# Patient Record
Sex: MALE | Race: WHITE | NOT HISPANIC OR LATINO | Employment: FULL TIME | URBAN - NONMETROPOLITAN AREA
[De-identification: names, ages, dates, MRNs, and addresses within clinical notes are randomized per-mention and may not be internally consistent; named-entity substitution may affect disease eponyms.]

---

## 2024-11-12 ENCOUNTER — HOSPITAL ENCOUNTER (EMERGENCY)
Facility: HOSPITAL | Age: 49
Discharge: OTHER INSTITUTION | End: 2024-11-13
Attending: STUDENT IN AN ORGANIZED HEALTH CARE EDUCATION/TRAINING PROGRAM
Payer: COMMERCIAL

## 2024-11-12 DIAGNOSIS — F10.10 ALCOHOL ABUSE: Primary | ICD-10-CM

## 2024-11-12 LAB
ALBUMIN SERPL-MCNC: 4.2 G/DL (ref 3.5–5.2)
ALBUMIN/GLOB SERPL: 1.5 G/DL
ALP SERPL-CCNC: 98 U/L (ref 39–117)
ALT SERPL W P-5'-P-CCNC: 36 U/L (ref 1–41)
AMPHET+METHAMPHET UR QL: NEGATIVE
AMPHETAMINES UR QL: NEGATIVE
ANION GAP SERPL CALCULATED.3IONS-SCNC: 13.1 MMOL/L (ref 5–15)
APAP SERPL-MCNC: <5 MCG/ML (ref 0–30)
AST SERPL-CCNC: 105 U/L (ref 1–40)
BARBITURATES UR QL SCN: NEGATIVE
BASOPHILS # BLD AUTO: 0.01 10*3/MM3 (ref 0–0.2)
BASOPHILS NFR BLD AUTO: 0.1 % (ref 0–1.5)
BENZODIAZ UR QL SCN: NEGATIVE
BILIRUB SERPL-MCNC: 0.6 MG/DL (ref 0–1.2)
BUN SERPL-MCNC: 14 MG/DL (ref 6–20)
BUN/CREAT SERPL: 15.4 (ref 7–25)
BUPRENORPHINE SERPL-MCNC: NEGATIVE NG/ML
CALCIUM SPEC-SCNC: 8.5 MG/DL (ref 8.6–10.5)
CANNABINOIDS SERPL QL: POSITIVE
CHLORIDE SERPL-SCNC: 96 MMOL/L (ref 98–107)
CO2 SERPL-SCNC: 25.9 MMOL/L (ref 22–29)
COCAINE UR QL: NEGATIVE
CREAT SERPL-MCNC: 0.91 MG/DL (ref 0.76–1.27)
DEPRECATED RDW RBC AUTO: 42.3 FL (ref 37–54)
EGFRCR SERPLBLD CKD-EPI 2021: 104 ML/MIN/1.73
EOSINOPHIL # BLD AUTO: 0.05 10*3/MM3 (ref 0–0.4)
EOSINOPHIL NFR BLD AUTO: 0.7 % (ref 0.3–6.2)
ERYTHROCYTE [DISTWIDTH] IN BLOOD BY AUTOMATED COUNT: 12.3 % (ref 12.3–15.4)
ETHANOL BLD-MCNC: 131 MG/DL (ref 0–10)
ETHANOL BLD-MCNC: 181 MG/DL (ref 0–10)
ETHANOL BLD-MCNC: 289 MG/DL (ref 0–10)
ETHANOL BLD-MCNC: 98 MG/DL (ref 0–10)
ETHANOL UR QL: 0.1 %
ETHANOL UR QL: 0.13 %
ETHANOL UR QL: 0.18 %
ETHANOL UR QL: 0.29 %
FENTANYL UR-MCNC: NEGATIVE NG/ML
GLOBULIN UR ELPH-MCNC: 2.8 GM/DL
GLUCOSE SERPL-MCNC: 101 MG/DL (ref 65–99)
HCT VFR BLD AUTO: 41.6 % (ref 37.5–51)
HGB BLD-MCNC: 15.1 G/DL (ref 13–17.7)
HOLD SPECIMEN: NORMAL
HOLD SPECIMEN: NORMAL
IMM GRANULOCYTES # BLD AUTO: 0.04 10*3/MM3 (ref 0–0.05)
IMM GRANULOCYTES NFR BLD AUTO: 0.6 % (ref 0–0.5)
LYMPHOCYTES # BLD AUTO: 2.13 10*3/MM3 (ref 0.7–3.1)
LYMPHOCYTES NFR BLD AUTO: 30.6 % (ref 19.6–45.3)
MCH RBC QN AUTO: 33.6 PG (ref 26.6–33)
MCHC RBC AUTO-ENTMCNC: 36.3 G/DL (ref 31.5–35.7)
MCV RBC AUTO: 92.7 FL (ref 79–97)
METHADONE UR QL SCN: POSITIVE
MONOCYTES # BLD AUTO: 0.36 10*3/MM3 (ref 0.1–0.9)
MONOCYTES NFR BLD AUTO: 5.2 % (ref 5–12)
NEUTROPHILS NFR BLD AUTO: 4.38 10*3/MM3 (ref 1.7–7)
NEUTROPHILS NFR BLD AUTO: 62.8 % (ref 42.7–76)
NRBC BLD AUTO-RTO: 0 /100 WBC (ref 0–0.2)
OPIATES UR QL: NEGATIVE
OXYCODONE UR QL SCN: NEGATIVE
PCP UR QL SCN: NEGATIVE
PLATELET # BLD AUTO: 225 10*3/MM3 (ref 140–450)
PMV BLD AUTO: 9.4 FL (ref 6–12)
POTASSIUM SERPL-SCNC: 3.9 MMOL/L (ref 3.5–5.2)
PROT SERPL-MCNC: 7 G/DL (ref 6–8.5)
RBC # BLD AUTO: 4.49 10*6/MM3 (ref 4.14–5.8)
SALICYLATES SERPL-MCNC: <0.3 MG/DL
SODIUM SERPL-SCNC: 135 MMOL/L (ref 136–145)
TRICYCLICS UR QL SCN: NEGATIVE
TSH SERPL DL<=0.05 MIU/L-ACNC: 0.62 UIU/ML (ref 0.27–4.2)
WBC NRBC COR # BLD AUTO: 6.97 10*3/MM3 (ref 3.4–10.8)
WHOLE BLOOD HOLD COAG: NORMAL
WHOLE BLOOD HOLD SPECIMEN: NORMAL

## 2024-11-12 PROCEDURE — 36415 COLL VENOUS BLD VENIPUNCTURE: CPT

## 2024-11-12 PROCEDURE — 82077 ASSAY SPEC XCP UR&BREATH IA: CPT | Performed by: EMERGENCY MEDICINE

## 2024-11-12 PROCEDURE — 93005 ELECTROCARDIOGRAM TRACING: CPT | Performed by: STUDENT IN AN ORGANIZED HEALTH CARE EDUCATION/TRAINING PROGRAM

## 2024-11-12 PROCEDURE — 80143 DRUG ASSAY ACETAMINOPHEN: CPT | Performed by: NURSE PRACTITIONER

## 2024-11-12 PROCEDURE — 82077 ASSAY SPEC XCP UR&BREATH IA: CPT | Performed by: NURSE PRACTITIONER

## 2024-11-12 PROCEDURE — 80179 DRUG ASSAY SALICYLATE: CPT | Performed by: NURSE PRACTITIONER

## 2024-11-12 PROCEDURE — 80307 DRUG TEST PRSMV CHEM ANLYZR: CPT | Performed by: NURSE PRACTITIONER

## 2024-11-12 PROCEDURE — 99285 EMERGENCY DEPT VISIT HI MDM: CPT | Performed by: STUDENT IN AN ORGANIZED HEALTH CARE EDUCATION/TRAINING PROGRAM

## 2024-11-12 PROCEDURE — 80050 GENERAL HEALTH PANEL: CPT | Performed by: NURSE PRACTITIONER

## 2024-11-12 PROCEDURE — 81003 URINALYSIS AUTO W/O SCOPE: CPT | Performed by: EMERGENCY MEDICINE

## 2024-11-12 RX ORDER — METHADONE HYDROCHLORIDE 10 MG/1
20 TABLET ORAL DAILY
COMMUNITY

## 2024-11-12 RX ORDER — DIAZEPAM 5 MG/1
5 TABLET ORAL ONCE
Status: COMPLETED | OUTPATIENT
Start: 2024-11-12 | End: 2024-11-12

## 2024-11-12 RX ORDER — SODIUM CHLORIDE 0.9 % (FLUSH) 0.9 %
10 SYRINGE (ML) INJECTION AS NEEDED
Status: DISCONTINUED | OUTPATIENT
Start: 2024-11-12 | End: 2024-11-13 | Stop reason: HOSPADM

## 2024-11-12 RX ORDER — NICOTINE 21 MG/24HR
1 PATCH, TRANSDERMAL 24 HOURS TRANSDERMAL
Status: DISCONTINUED | OUTPATIENT
Start: 2024-11-12 | End: 2024-11-13 | Stop reason: HOSPADM

## 2024-11-12 RX ADMIN — NICOTINE 1 PATCH: 14 PATCH TRANSDERMAL at 13:44

## 2024-11-12 RX ADMIN — DIAZEPAM 5 MG: 5 TABLET ORAL at 20:59

## 2024-11-12 NOTE — ED PROVIDER NOTES
"Subjective:  History of Present Illness:    Patient is a 49-year-old male without contributing health history.  Presents to the ER today for psychiatric evaluation and possible detox.  Patient reports that he drinks approximately 1 pint of vodka per day.  Has done so for several months.  Reports that he did have some issues with hypertension when trying to quit alcohol in the past.  Denies seizures.  Denies any other detox symptoms.  Reports last drink was approximately 1 hour PTA.  Reports he had 2 shots.  Patient smokes approximately 1 pack/day.  Denies current illicit use.  Does report history of marijuana use.  Denies OTC medication home remedy.  Denies alleviating or exacerbating factors.    Nurses Notes reviewed and agree, including vitals, allergies, social history and prior medical history.     REVIEW OF SYSTEMS: All systems reviewed and not pertinent unless noted.  Review of Systems   All other systems reviewed and are negative.      History reviewed. No pertinent past medical history.    Allergies:    Patient has no known allergies.      History reviewed. No pertinent surgical history.      Social History     Socioeconomic History    Marital status:    Tobacco Use    Smoking status: Every Day     Current packs/day: 1.00     Average packs/day: 1 pack/day for 24.9 years (24.9 ttl pk-yrs)     Types: Cigarettes     Start date: 2000    Smokeless tobacco: Never   Vaping Use    Vaping status: Never Used   Substance and Sexual Activity    Drug use: Not Currently         History reviewed. No pertinent family history.    Objective  Physical Exam:  /93   Pulse 71   Temp 98 °F (36.7 °C) (Oral)   Resp 18   Ht 177.8 cm (70\")   Wt 69.4 kg (153 lb)   SpO2 97%   BMI 21.95 kg/m²      Physical Exam  Vitals and nursing note reviewed.   Constitutional:       Appearance: Normal appearance. He is normal weight.   HENT:      Head: Normocephalic and atraumatic.      Nose: Nose normal.      Mouth/Throat:      " Mouth: Mucous membranes are moist.      Pharynx: Oropharynx is clear.   Eyes:      Extraocular Movements: Extraocular movements intact.      Conjunctiva/sclera: Conjunctivae normal.      Pupils: Pupils are equal, round, and reactive to light.   Cardiovascular:      Rate and Rhythm: Normal rate and regular rhythm.      Pulses: Normal pulses.      Heart sounds: Normal heart sounds.   Pulmonary:      Effort: Pulmonary effort is normal.      Breath sounds: Normal breath sounds.   Abdominal:      General: Abdomen is flat. Bowel sounds are normal.      Palpations: Abdomen is soft.   Musculoskeletal:         General: Normal range of motion.      Cervical back: Normal range of motion and neck supple.   Skin:     General: Skin is warm.      Capillary Refill: Capillary refill takes less than 2 seconds.   Neurological:      General: No focal deficit present.      Mental Status: He is alert and oriented to person, place, and time. Mental status is at baseline.   Psychiatric:         Mood and Affect: Mood normal.         Behavior: Behavior normal.         Thought Content: Thought content normal.         Judgment: Judgment normal.         Procedures    ED Course:    ED Course as of 11/18/24 1140   Tue Nov 12, 2024   1258 I have reviewed the mid-level provider(s) note and verbally discussed the case/plan of care.  I was available for consultation as needed at all times during the patient's visit in the Emergency Department.  I agree with the clinical impression, plan, and disposition unless otherwise stated in the MDM below.    ATTENDING ATTESTATION  HPI: 49-year-old male with past medical history of chronic alcohol abuse who presents requesting detox.  Last drink just prior to arrival.    MDM: ED workup reviewed.    EKG per my interpretation normal sinus rhythm, rate 75, normal axis, no ST segment elevation or depression, normal QRS QTC intervals.    I have reviewed the labs results.  CBC and CMP clinically unremarkable.  EtOH  289.  UDS positive for THC and methadone.     [JS]   2129 Patient signed out to me by RANDY, patient to be reevaluated after ethanol rates 100 to go to detox. [CR]   Wed Nov 13, 2024   0000 Patient case discussed with therapist, plan to get patient to detox. [CR]      ED Course User Index  [CR] Mo Smith DO  [JS] Thai Cartwright DO       Lab Results (last 24 hours)       ** No results found for the last 24 hours. **             No radiology results from the last 24 hrs       MDM      Initial impression of presenting illness: Patient is a 49-year-old male without contributing health history.  Presents to the ER today for psychiatric evaluation and possible detox.  Patient reports that he drinks approximately 1 pint of vodka per day.  Has done so for several months.  Reports that he did have some issues with hypertension when trying to quit alcohol in the past.  Denies seizures.  Denies any other detox symptoms.  Reports last drink was approximately 1 hour PTA.  Reports he had 2 shots.  Patient smokes approximately 1 pack/day.  Denies current illicit use.  Does report history of marijuana use.  Denies OTC medication home remedy.  Denies alleviating or exacerbating factors.    DDX: includes but is not limited to: Alcohol intoxication, alcohol abuse, depression, anxiety or other    Patient arrives stable with vitals interpreted by myself.     Pertinent features from physical exam: Lung sounds are clear bilaterally throughout.  Abdo soft nontender.  Bowel sounds normal.  Card sounds normal..    Initial diagnostic plan: CBC, CMP, TSH, salicylate, acetaminophen, urine drug screen, urine fentanyl, EtOH EKG    Results from initial plan were reviewed and interpreted by me revealing CBC is within appropriate range.  CMP is within appropriate range.  UDS is positive for THC and methadone.  Urine fentanyl was negative.  EtOH initially was 279.  Recheck was 181.  Third check was 131.  EKG sinus rhythm rate 71  bpm    Diagnostic information from other sources: Chart review    Interventions / Re-evaluation: Vital signs stable throughout counter.  Patient had CIWA score of 11.  Patient was given 5 mg of Valium p.o.    Results/clinical rationale were discussed with patient    Consultations/Discussion of results with other physicians: Behavioral health specialist spoke with patient while here in the ER.    Disposition plan: This provider is going off shift.  Disposition of this patient will be completed by Dr. tom  -----        Final diagnoses:   Alcohol abuse          David Banda, APRN  11/18/24 1144

## 2024-11-13 ENCOUNTER — HOSPITAL ENCOUNTER (INPATIENT)
Facility: HOSPITAL | Age: 49
LOS: 4 days | Discharge: HOME | DRG: 897 | End: 2024-11-17
Attending: PSYCHIATRY & NEUROLOGY | Admitting: PSYCHIATRY & NEUROLOGY
Payer: COMMERCIAL

## 2024-11-13 VITALS
OXYGEN SATURATION: 97 % | TEMPERATURE: 98 F | SYSTOLIC BLOOD PRESSURE: 166 MMHG | HEIGHT: 70 IN | DIASTOLIC BLOOD PRESSURE: 93 MMHG | RESPIRATION RATE: 18 BRPM | HEART RATE: 71 BPM | BODY MASS INDEX: 21.9 KG/M2 | WEIGHT: 153 LBS

## 2024-11-13 PROBLEM — F11.20 OPIOID USE DISORDER, SEVERE, ON MAINTENANCE THERAPY: Status: ACTIVE | Noted: 2024-11-13

## 2024-11-13 PROBLEM — I10 HTN (HYPERTENSION): Status: ACTIVE | Noted: 2024-11-13

## 2024-11-13 PROBLEM — F10.20 ALCOHOL DEPENDENCE: Status: ACTIVE | Noted: 2024-11-13

## 2024-11-13 PROBLEM — F17.200 NICOTINE USE DISORDER: Status: ACTIVE | Noted: 2024-11-13

## 2024-11-13 PROBLEM — F12.20 TETRAHYDROCANNABINOL (THC) USE DISORDER, MODERATE, DEPENDENCE: Status: ACTIVE | Noted: 2024-11-13

## 2024-11-13 LAB
BILIRUB UR QL STRIP: NEGATIVE
CLARITY UR: CLEAR
COLOR UR: YELLOW
GLUCOSE UR STRIP-MCNC: NEGATIVE MG/DL
HAV IGM SERPL QL IA: NORMAL
HBV CORE IGM SERPL QL IA: NORMAL
HBV SURFACE AG SERPL QL IA: NORMAL
HCV AB SER QL: NORMAL
HGB UR QL STRIP.AUTO: NEGATIVE
KETONES UR QL STRIP: NEGATIVE
LEUKOCYTE ESTERASE UR QL STRIP.AUTO: NEGATIVE
NITRITE UR QL STRIP: NEGATIVE
PH UR STRIP.AUTO: 5.5 [PH] (ref 5–8)
PROT UR QL STRIP: NEGATIVE
QT INTERVAL: 346 MS
QTC INTERVAL: 418 MS
SP GR UR STRIP: 1.01 (ref 1–1.03)
UROBILINOGEN UR QL STRIP: NORMAL

## 2024-11-13 PROCEDURE — 96374 THER/PROPH/DIAG INJ IV PUSH: CPT

## 2024-11-13 PROCEDURE — 25010000002 LORAZEPAM PER 2 MG: Performed by: EMERGENCY MEDICINE

## 2024-11-13 PROCEDURE — 93005 ELECTROCARDIOGRAM TRACING: CPT | Performed by: PSYCHIATRY & NEUROLOGY

## 2024-11-13 PROCEDURE — 80074 ACUTE HEPATITIS PANEL: CPT | Performed by: PSYCHIATRY & NEUROLOGY

## 2024-11-13 PROCEDURE — 99223 1ST HOSP IP/OBS HIGH 75: CPT | Performed by: PSYCHIATRY & NEUROLOGY

## 2024-11-13 PROCEDURE — HZ2ZZZZ DETOXIFICATION SERVICES FOR SUBSTANCE ABUSE TREATMENT: ICD-10-PCS | Performed by: PSYCHIATRY & NEUROLOGY

## 2024-11-13 RX ORDER — LOSARTAN POTASSIUM 50 MG/1
25 TABLET ORAL DAILY
Status: DISCONTINUED | OUTPATIENT
Start: 2024-11-13 | End: 2024-11-17 | Stop reason: HOSPADM

## 2024-11-13 RX ORDER — LOSARTAN POTASSIUM 25 MG/1
25 TABLET ORAL DAILY
Status: ON HOLD | COMMUNITY
End: 2024-11-17

## 2024-11-13 RX ORDER — ECHINACEA PURPUREA EXTRACT 125 MG
2 TABLET ORAL AS NEEDED
Status: DISCONTINUED | OUTPATIENT
Start: 2024-11-13 | End: 2024-11-17 | Stop reason: HOSPADM

## 2024-11-13 RX ORDER — FAMOTIDINE 20 MG/1
20 TABLET, FILM COATED ORAL 2 TIMES DAILY PRN
Status: DISCONTINUED | OUTPATIENT
Start: 2024-11-13 | End: 2024-11-17 | Stop reason: HOSPADM

## 2024-11-13 RX ORDER — LORAZEPAM 0.5 MG/1
2 TABLET ORAL ONCE
Status: COMPLETED | OUTPATIENT
Start: 2024-11-13 | End: 2024-11-13

## 2024-11-13 RX ORDER — ALUMINA, MAGNESIA, AND SIMETHICONE 2400; 2400; 240 MG/30ML; MG/30ML; MG/30ML
15 SUSPENSION ORAL EVERY 6 HOURS PRN
Status: DISCONTINUED | OUTPATIENT
Start: 2024-11-13 | End: 2024-11-17 | Stop reason: HOSPADM

## 2024-11-13 RX ORDER — LOPERAMIDE HYDROCHLORIDE 2 MG/1
2 CAPSULE ORAL
Status: DISCONTINUED | OUTPATIENT
Start: 2024-11-13 | End: 2024-11-17 | Stop reason: HOSPADM

## 2024-11-13 RX ORDER — METHADONE HYDROCHLORIDE 10 MG/1
20 TABLET ORAL DAILY
Status: CANCELLED | OUTPATIENT
Start: 2024-11-13

## 2024-11-13 RX ORDER — BENZTROPINE MESYLATE 1 MG/1
2 TABLET ORAL ONCE AS NEEDED
Status: DISCONTINUED | OUTPATIENT
Start: 2024-11-13 | End: 2024-11-17 | Stop reason: HOSPADM

## 2024-11-13 RX ORDER — ONDANSETRON 4 MG/1
4 TABLET, ORALLY DISINTEGRATING ORAL EVERY 6 HOURS PRN
Status: DISCONTINUED | OUTPATIENT
Start: 2024-11-13 | End: 2024-11-17 | Stop reason: HOSPADM

## 2024-11-13 RX ORDER — METHADONE HYDROCHLORIDE 10 MG/1
20 TABLET ORAL DAILY
Status: DISCONTINUED | OUTPATIENT
Start: 2024-11-13 | End: 2024-11-17 | Stop reason: HOSPADM

## 2024-11-13 RX ORDER — LORAZEPAM 2 MG/1
2 TABLET ORAL EVERY 4 HOURS PRN
Status: DISCONTINUED | OUTPATIENT
Start: 2024-11-14 | End: 2024-11-15

## 2024-11-13 RX ORDER — BENZONATATE 100 MG/1
100 CAPSULE ORAL 3 TIMES DAILY PRN
Status: DISCONTINUED | OUTPATIENT
Start: 2024-11-13 | End: 2024-11-17 | Stop reason: HOSPADM

## 2024-11-13 RX ORDER — IBUPROFEN 400 MG/1
400 TABLET, FILM COATED ORAL EVERY 6 HOURS PRN
Status: DISCONTINUED | OUTPATIENT
Start: 2024-11-13 | End: 2024-11-17 | Stop reason: HOSPADM

## 2024-11-13 RX ORDER — POLYETHYLENE GLYCOL 3350 17 G/17G
17 POWDER, FOR SOLUTION ORAL DAILY PRN
Status: DISCONTINUED | OUTPATIENT
Start: 2024-11-13 | End: 2024-11-17 | Stop reason: HOSPADM

## 2024-11-13 RX ORDER — TRAZODONE HYDROCHLORIDE 50 MG/1
50 TABLET, FILM COATED ORAL NIGHTLY PRN
Status: DISCONTINUED | OUTPATIENT
Start: 2024-11-13 | End: 2024-11-17 | Stop reason: HOSPADM

## 2024-11-13 RX ORDER — LORAZEPAM 0.5 MG/1
0.5 TABLET ORAL
Status: DISCONTINUED | OUTPATIENT
Start: 2024-11-17 | End: 2024-11-15

## 2024-11-13 RX ORDER — LORAZEPAM 2 MG/1
2 TABLET ORAL
Status: COMPLETED | OUTPATIENT
Start: 2024-11-14 | End: 2024-11-14

## 2024-11-13 RX ORDER — LORAZEPAM 2 MG/1
2 TABLET ORAL
Status: DISPENSED | OUTPATIENT
Start: 2024-11-13 | End: 2024-11-14

## 2024-11-13 RX ORDER — ACETAMINOPHEN 325 MG/1
650 TABLET ORAL EVERY 6 HOURS PRN
Status: DISCONTINUED | OUTPATIENT
Start: 2024-11-13 | End: 2024-11-17 | Stop reason: HOSPADM

## 2024-11-13 RX ORDER — LORAZEPAM 2 MG/ML
2 INJECTION INTRAMUSCULAR ONCE
Status: COMPLETED | OUTPATIENT
Start: 2024-11-13 | End: 2024-11-13

## 2024-11-13 RX ORDER — HYDROXYZINE HYDROCHLORIDE 50 MG/1
50 TABLET, FILM COATED ORAL EVERY 6 HOURS PRN
Status: DISCONTINUED | OUTPATIENT
Start: 2024-11-13 | End: 2024-11-17 | Stop reason: HOSPADM

## 2024-11-13 RX ORDER — LORAZEPAM 1 MG/1
1 TABLET ORAL EVERY 4 HOURS PRN
Status: DISCONTINUED | OUTPATIENT
Start: 2024-11-16 | End: 2024-11-15

## 2024-11-13 RX ORDER — BENZTROPINE MESYLATE 1 MG/ML
1 INJECTION, SOLUTION INTRAMUSCULAR; INTRAVENOUS ONCE AS NEEDED
Status: DISCONTINUED | OUTPATIENT
Start: 2024-11-13 | End: 2024-11-17 | Stop reason: HOSPADM

## 2024-11-13 RX ORDER — LORAZEPAM 0.5 MG/1
0.5 TABLET ORAL EVERY 4 HOURS PRN
Status: DISCONTINUED | OUTPATIENT
Start: 2024-11-17 | End: 2024-11-15

## 2024-11-13 RX ORDER — LORAZEPAM 1 MG/1
1 TABLET ORAL
Status: DISCONTINUED | OUTPATIENT
Start: 2024-11-16 | End: 2024-11-15

## 2024-11-13 RX ADMIN — LORAZEPAM 2 MG: 2 TABLET ORAL at 08:20

## 2024-11-13 RX ADMIN — METHADONE HYDROCHLORIDE 20 MG: 10 TABLET ORAL at 09:35

## 2024-11-13 RX ADMIN — LOSARTAN POTASSIUM 25 MG: 50 TABLET, FILM COATED ORAL at 08:21

## 2024-11-13 RX ADMIN — LORAZEPAM 2 MG: 2 TABLET ORAL at 20:11

## 2024-11-13 RX ADMIN — HYDROXYZINE HYDROCHLORIDE 50 MG: 50 TABLET, FILM COATED ORAL at 08:20

## 2024-11-13 RX ADMIN — LORAZEPAM 2 MG: 2 TABLET ORAL at 03:47

## 2024-11-13 RX ADMIN — LORAZEPAM 2 MG: 0.5 TABLET ORAL at 02:02

## 2024-11-13 RX ADMIN — LORAZEPAM 2 MG: 2 TABLET ORAL at 12:17

## 2024-11-13 RX ADMIN — LORAZEPAM 2 MG: 2 INJECTION INTRAMUSCULAR; INTRAVENOUS at 01:10
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
## 2024-11-13 NOTE — H&P
INITIAL PSYCHIATRIC HISTORY & PHYSICAL    Patient Identification:  Name:  Alex Hoover  Age:  49 y.o.  Sex:  male  :  1975  MRN:  [id number]   Visit Number:  [id number]  Primary Care Physician:  Provider, No Known    SUBJECTIVE    CC/Focus of Exam: Alcohol use    HPI: Alex Hoover is a 49 y.o. male who was admitted on 2024 with complaints of alcohol use and withdrawals. The patient reports a long history of substance use. First use was at age 18 and drank socially af first. About a year ago, he started drinking regularly. Over time the use increased and the patient  continued to use despite negative consequences including relationship problems, social and financial problems. The patient endorses symptoms of tolerance and withdrawals and ongoing cravings to use. Has tried to cut down and stop but has not been successful. Spends too much time and resources in pursuit of substance use. Longest period of sobriety is reported to be a couple of days.  Currently using 3 quarters to a pint of vodka daily.   Last use was yesterday.   Withdrawal symptoms include shakes, tremors, nervousness.     PAST PSYCHIATRIC HX: Patient has been treated for mood symptoms in the past.     SUBSTANCE USE HX: See HPI for alcohol. The patient reports a history of pain medications around age 20 after surgeries and has been on methadone maintenance for the last couple of years. His current dose of methadone is 20 mg daily.     SOCIAL HX:   Social History     Socioeconomic History    Marital status:    Tobacco Use    Smoking status: Every Day     Current packs/day: 1.00     Average packs/day: 1 pack/day for 24.9 years (24.9 ttl pk-yrs)     Types: Cigarettes     Start date:     Smokeless tobacco: Never   Vaping Use    Vaping status: Never Used   Substance and Sexual Activity    Drug use: Not Currently     Past Medical History: HTN    Past Surgical History: 3 arthroscopic knee surgeries.     Family history: Father  has a history of alcoholism.       Medications Prior to Admission   Medication Sig Dispense Refill Last Dose/Taking    methadone (DOLOPHINE) 10 MG tablet Take 2 tablets by mouth Daily,   11/12/2024 Morning    losartan (COZAAR) 25 MG tablet Take 1 tablet by mouth Daily.   Unknown         ALLERGIES:  Patient has no known allergies.    Temp:  [97 °F (36.1 °C)-98 °F (36.7 °C)] 97 °F (36.1 °C)  Heart Rate:  [71-99] 80  Resp:  [15-18] 15  BP: (133-166)/() 146/85    REVIEW OF SYSTEMS:  Review of Systems   Constitutional:  Positive for chills, diaphoresis and fatigue.   HENT: Negative.     Eyes: Negative.    Respiratory: Negative.     Cardiovascular: Negative.    Gastrointestinal:  Positive for abdominal pain.   Endocrine: Negative.    Genitourinary: Negative.    Musculoskeletal:  Positive for myalgias.   Skin: Negative.    Neurological:  Positive for tremors and weakness.   Hematological: Negative.    Psychiatric/Behavioral:  Positive for dysphoric mood. The patient is nervous/anxious.         OBJECTIVE    PHYSICAL EXAM:  Physical Exam  Constitutional:  Appears well-developed and well-nourished.   HENT:   Head: Normocephalic and atraumatic.   Right Ear: External ear normal.   Left Ear: External ear normal.   Mouth/Throat: Oropharynx is clear and moist.   Eyes: Pupils are equal, round, and reactive to light. Conjunctivae and EOM are normal.   Neck: Normal range of motion. Neck supple.   Cardiovascular: Normal rate, regular rhythm and normal heart sounds.    Respiratory: Effort normal and breath sounds normal. No respiratory distress. No wheezes.   GI: Soft. Bowel sounds are normal.No distension. There is no tenderness.   Musculoskeletal: Normal range of motion. No edema or deformity.   Neurological:  Cranial Nerves: I. No anosmia. II: No visual disturbance. III, IV VI: EOMI, PERRLA. V: Corneal reflext intact, no abnormal sensations. VII: No facial palsy, or altered sensation. VIII: Hearing intact, balance intact. IX:  Intact ah reflex. X: Normal phonation, swallowing. XI: Normal shrug and head movement. XII: Intact tongue movements  Coordination normal. No lateralizing signs.  Skin: Skin is warm and dry. No rash noted. No erythema.     MENTAL STATUS EXAM:   Hygiene:   fair  Cooperation:  Cooperative  Eye Contact:  Fair  Psychomotor Behavior:  Appropriate  Affect:  Appropriate  Hopelessness: Denies  Speech:  Normal  Thought Process: Goal directed  Thought Content:  Normal  Suicidal:  None  Homicidal:  None  Hallucinations:  None  Delusion:  None  Memory:  Intact  Orientation:  Person, Place, Time and Situation  Reliability:  fair  Insight:  Fair  Judgement:  Fair  Impulse Control:  Fair    Imaging Results (Last 24 Hours)       ** No results found for the last 24 hours. **             ECG/EMG Results (most recent)       Procedure Component Value Units Date/Time    ECG 12 Lead Other; Baseline Cardiac Status [[id number]] Collected: 11/14/24 0434     Updated: 11/13/24 0435     QT Interval 346 ms      QTC Interval 418 ms     Narrative:      Test Reason : Other~  Blood Pressure :   */*   mmHG  Vent. Rate :  88 BPM     Atrial Rate :  88 BPM     P-R Int : 134 ms          QRS Dur :  86 ms      QT Int : 346 ms       P-R-T Axes :  63   1  60 degrees    QTcB Int : 418 ms    Normal sinus rhythm  Normal ECG  No previous ECGs available    Referred By:            Confirmed By:              Lab Results   Component Value Date    GLUCOSE 101 (H) 11/12/2024    BUN 14 11/12/2024    CREATININE 0.91 11/12/2024    BCR 15.4 11/12/2024    CO2 25.9 11/12/2024    CALCIUM 8.5 (L) 11/12/2024    ALBUMIN 4.2 11/12/2024     (H) 11/12/2024    ALT 36 11/12/2024       Lab Results   Component Value Date    WBC 6.97 11/12/2024    HGB 15.1 11/12/2024    HCT 41.6 11/12/2024    MCV 92.7 11/12/2024     11/12/2024       Last Urine Toxicity          Latest Ref Rng & Units 11/12/2024   LAST URINE TOXICITY RESULTS   Amphetamine, Urine Qual Negative Negative     Barbiturates Screen, Urine Negative Negative    Benzodiazepine Screen, Urine Negative Negative    Buprenorphine, Screen, Urine Negative Negative    Cocaine Screen, Urine Negative Negative    Fentanyl, Urine Negative Negative    Methadone Screen , Urine Negative Positive    Methamphetamine, Ur Negative Negative       Details                   Brief Urine Lab Results  (Last result in the past 365 days)        Color   Clarity   Blood   Leuk Est   Nitrite   Protein   CREAT   Urine HCG        11/12/24 1327 Yellow   Clear   Negative   Negative   Negative   Negative                   DATA  Labs reviewed. Sodium 135, chloride 96, glucose 101, calcium 8.5, . MCH 33.6, MCHC 36.3. Hep screen negative. Blood alcohol level 289 mg/dL. UDS positive for methadone, thc.   EKG reviewed. QTc 459 ms  JACKY reviewed.   Record reviewed. No previous treatment noted in this hospital for mental health or substance use problems.       Strengths: Motivated for treatment    Weaknesses:Unemployed, Substance use, and Poor coping skills    Code status:  Full  Discussed code status with patient.    ASSESSMENT & PLAN:    Hospital bed: No      Alcohol use disorder, severe, dependence  -Ativan detox  -Thiamine and folate      Opioid use disorder, severe, on maintenance therapy  -Continue methadone maintenance      HTN (hypertension)   -Continue losartan      Nicotine use disorder  -Encourage cessation      Tetrahydrocannabinol (THC) use disorder, moderate, dependence  -Supportive treatment      The patient has been admitted for safety and stabilization.  Patient will be monitored for suicidality daily and maintained on Special Precautions Level 4 (q30 min checks).  The patient will have individual and group therapy with a master's level therapist. A master treatment plan will be developed and agreed upon by the patient and his/her treatment team.  The patient's estimated length of stay in the hospital is 5-7 days.

## 2024-11-13 NOTE — PLAN OF CARE
Goal Outcome Evaluation:  Plan of Care Reviewed With: patient  Patient Agreement with Plan of Care: agrees     Progress:  (new patient  @ 0313 this am.)

## 2024-11-13 NOTE — DISCHARGE PLACEMENT REQUEST
"Licha Hoover (49 y.o. Male)       Date of Birth   1975    Social Security Number       Address   [address] [place] [address]    Home Phone   [phone]    MRN   [id number]       Ev[employer]   [employer]    Marital Status                               Admission Date   24    Admission Type   Urgent    Admitting Provider   Brayden Cash MD    Attending Provider   Lenora Mclain MD    Department, Room/Bed   [facility] ADULT CD, 1043/2S       Discharge Date       Discharge Disposition       Discharge Destination                                 Attending Provider: Lenora Mclain MD    Allergies: No Known Allergies    Isolation: None   Infection: None   Code Status: CPR    Ht: 177.8 cm (70\")   Wt: 69.1 kg (152 lb 6.4 oz)    Admission Cmt: None   Principal Problem: Alcohol use disorder, severe, dependence [F10.20]                   Active Insurance as of 2024       Primary Coverage       Payor Plan Insurance Group Employer/Plan Group    [employer] [place] [employer] [place]ICA[place]        Payor Plan Address Payor Plan Phone Number Payor Plan Fax Number Effective Dates    [address] [address] [phone]  2024 - None Entered    [facility] [id number]         Subscriber Name Subscriber Birth Date Member ID       LICHA HOOVER 1975 [id number]                     Emergency Contacts        (Rel.) Home Phone Work Phone Mobile Phone    KELLIE HEREDIA (Relative) [phone] -- --                 History & Physical        Lenora Mclain MD at 24 1151                INITIAL PSYCHIATRIC HISTORY & PHYSICAL    Patient Identification:  Name:  Licha Hoover  Age:  49 y.o.  Sex:  male  :  1975  MRN:  [id number]   Visit Number:  [id number]  Primary Care Physician:  Provider, No Known    SUBJECTIVE    CC/Focus of Exam: Alcohol use    HPI: Licha Hoover is a 49 y.o. male who was admitted on 2024 with complaints of alcohol use and withdrawals. The patient reports a long " history of substance use. First use was at age 18 and drank socially af first. About a year ago, he started drinking regularly. Over time the use increased and the patient  continued to use despite negative consequences including relationship problems, social and financial problems. The patient endorses symptoms of tolerance and withdrawals and ongoing cravings to use. Has tried to cut down and stop but has not been successful. Spends too much time and resources in pursuit of substance use. Longest period of sobriety is reported to be a couple of days.  Currently using 3 quarters to a pint of vodka daily.   Last use was yesterday.   Withdrawal symptoms include shakes, tremors, nervousness.     PAST PSYCHIATRIC HX: Patient has been treated for mood symptoms in the past.     SUBSTANCE USE HX: See HPI for alcohol. The patient reports a history of pain medications around age 20 after surgeries and has been on methadone maintenance for the last couple of years. His current dose of methadone is 20 mg daily.     SOCIAL HX:   Social History     Socioeconomic History    Marital status:    Tobacco Use    Smoking status: Every Day     Current packs/day: 1.00     Average packs/day: 1 pack/day for 24.9 years (24.9 ttl pk-yrs)     Types: Cigarettes     Start date: 2000    Smokeless tobacco: Never   Vaping Use    Vaping status: Never Used   Substance and Sexual Activity    Drug use: Not Currently     Past Medical History: HTN    Past Surgical History: 3 arthroscopic knee surgeries.     Family history: Father has a history of alcoholism.       Medications Prior to Admission   Medication Sig Dispense Refill Last Dose/Taking    methadone (DOLOPHINE) 10 MG tablet Take 2 tablets by mouth Daily,   11/12/2024 Morning    losartan (COZAAR) 25 MG tablet Take 1 tablet by mouth Daily.   Unknown         ALLERGIES:  Patient has no known allergies.    Temp:  [97 °F (36.1 °C)-98 °F (36.7 °C)] 97 °F (36.1 °C)  Heart Rate:  [71-99]  80  Resp:  [15-18] 15  BP: (133-166)/() 146/85    REVIEW OF SYSTEMS:  Review of Systems   Constitutional:  Positive for chills, diaphoresis and fatigue.   HENT: Negative.     Eyes: Negative.    Respiratory: Negative.     Cardiovascular: Negative.    Gastrointestinal:  Positive for abdominal pain.   Endocrine: Negative.    Genitourinary: Negative.    Musculoskeletal:  Positive for myalgias.   Skin: Negative.    Neurological:  Positive for tremors and weakness.   Hematological: Negative.    Psychiatric/Behavioral:  Positive for dysphoric mood. The patient is nervous/anxious.         OBJECTIVE    PHYSICAL EXAM:  Physical Exam  Constitutional:  Appears well-developed and well-nourished.   HENT:   Head: Normocephalic and atraumatic.   Right Ear: External ear normal.   Left Ear: External ear normal.   Mouth/Throat: Oropharynx is clear and moist.   Eyes: Pupils are equal, round, and reactive to light. Conjunctivae and EOM are normal.   Neck: Normal range of motion. Neck supple.   Cardiovascular: Normal rate, regular rhythm and normal heart sounds.    Respiratory: Effort normal and breath sounds normal. No respiratory distress. No wheezes.   GI: Soft. Bowel sounds are normal.No distension. There is no tenderness.   Musculoskeletal: Normal range of motion. No edema or deformity.   Neurological:  Cranial Nerves: I. No anosmia. II: No visual disturbance. III, IV VI: EOMI, PERRLA. V: Corneal reflext intact, no abnormal sensations. VII: No facial palsy, or altered sensation. VIII: Hearing intact, balance intact. IX: Intact ah reflex. X: Normal phonation, swallowing. XI: Normal shrug and head movement. XII: Intact tongue movements  Coordination normal. No lateralizing signs.  Skin: Skin is warm and dry. No rash noted. No erythema.     MENTAL STATUS EXAM:   Hygiene:   fair  Cooperation:  Cooperative  Eye Contact:  Fair  Psychomotor Behavior:  Appropriate  Affect:  Appropriate  Hopelessness: Denies  Speech:  Normal  Thought  Process: Goal directed  Thought Content:  Normal  Suicidal:  None  Homicidal:  None  Hallucinations:  None  Delusion:  None  Memory:  Intact  Orientation:  Person, Place, Time and Situation  Reliability:  fair  Insight:  Fair  Judgement:  Fair  Impulse Control:  Fair    Imaging Results (Last 24 Hours)       ** No results found for the last 24 hours. **             ECG/EMG Results (most recent)       Procedure Component Value Units Date/Time    ECG 12 Lead Other; Baseline Cardiac Status [[id number]] Collected: 11/14/24 0434     Updated: 11/13/24 0435     QT Interval 346 ms      QTC Interval 418 ms     Narrative:      Test Reason : Other~  Blood Pressure :   */*   mmHG  Vent. Rate :  88 BPM     Atrial Rate :  88 BPM     P-R Int : 134 ms          QRS Dur :  86 ms      QT Int : 346 ms       P-R-T Axes :  63   1  60 degrees    QTcB Int : 418 ms    Normal sinus rhythm  Normal ECG  No previous ECGs available    Referred By:            Confirmed By:              Lab Results   Component Value Date    GLUCOSE 101 (H) 11/12/2024    BUN 14 11/12/2024    CREATININE 0.91 11/12/2024    BCR 15.4 11/12/2024    CO2 25.9 11/12/2024    CALCIUM 8.5 (L) 11/12/2024    ALBUMIN 4.2 11/12/2024     (H) 11/12/2024    ALT 36 11/12/2024       Lab Results   Component Value Date    WBC 6.97 11/12/2024    HGB 15.1 11/12/2024    HCT 41.6 11/12/2024    MCV 92.7 11/12/2024     11/12/2024       Last Urine Toxicity          Latest Ref Rng & Units 11/12/2024   LAST URINE TOXICITY RESULTS   Amphetamine, Urine Qual Negative Negative    Barbiturates Screen, Urine Negative Negative    Benzodiazepine Screen, Urine Negative Negative    Buprenorphine, Screen, Urine Negative Negative    Cocaine Screen, Urine Negative Negative    Fentanyl, Urine Negative Negative    Methadone Screen , Urine Negative Positive    Methamphetamine, Ur Negative Negative       Details                   Brief Urine Lab Results  (Last result in the past 365 days)         Color   Clarity   Blood   Leuk Est   Nitrite   Protein   CREAT   Urine HCG        11/12/24 1327 Yellow   Clear   Negative   Negative   Negative   Negative                   DATA  Labs reviewed. Sodium 135, chloride 96, glucose 101, calcium 8.5, . MCH 33.6, MCHC 36.3. Hep screen negative. Blood alcohol level 289 mg/dL. UDS positive for methadone, thc.   EKG reviewed. QTc 459 ms  JACKY reviewed.   Record reviewed. No previous treatment noted in this hospital for mental health or substance use problems.       Strengths: Motivated for treatment    Weaknesses:Unemployed, Substance use, and Poor coping skills    Code status:  Full  Discussed code status with patient.    ASSESSMENT & PLAN:    Hospital bed: No      Alcohol use disorder, severe, dependence  -Ativan detox  -Thiamine and folate      Opioid use disorder, severe, on maintenance therapy  -Continue methadone maintenance      HTN (hypertension)   -Continue losartan      Nicotine use disorder  -Encourage cessation      Tetrahydrocannabinol (THC) use disorder, moderate, dependence  -Supportive treatment      The patient has been admitted for safety and stabilization.  Patient will be monitored for suicidality daily and maintained on Special Precautions Level 4 (q30 min checks).  The patient will have individual and group therapy with a master's level therapist. A master treatment plan will be developed and agreed upon by the patient and his/her treatment team.  The patient's estimated length of stay in the hospital is 5-7 days.             Electronically signed by Lenora Mclain MD at 11/13/24 1205       Physician Progress Notes (last 24 hours)  Notes from 11/12/24 1318 through 11/13/24 1318   No notes of this type exist for this encounter.

## 2024-11-13 NOTE — PLAN OF CARE
Goal Outcome Evaluation:  Plan of Care Reviewed With: patient  Plan of Care Reviewed With: patient  Patient Agreement with Plan of Care: agrees  Consent Given to Review Plan with: Pt new admit from [facility] The death of mother increased drinking few months ago. Seperation and marital problems with wife is increased stressers. Pt ratess Anx 8 Dep 8 Denies any SI/HI/AVH or history thereof Pt experiencing elevated blood pressure, tachycardia, tremors, nausea,body aches and generalysed weakness Pt reports drinking pint of vodka daily Experiencing adequate bowel movement and urination without any issues He has Hx of HTN Per intakePatient reports presenting to the emergency department for help with alcohol abuse. Patient tells me he drinks 1 pint of vodka daily and has been for the past couple of months. Patient's denies SI/HI. ETOH on arrival was 289. CIWA at the time of assessment was 12. Pt is recieving methadone treatment
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
## 2024-11-13 NOTE — ED PROVIDER NOTES
Patient care transferred to me at 0030 by Dr. Smith at time of shift change ending behavioral health disposition.    Behavioral health request 2 mg of p.o. Ativan and he has been accepted by Dr. Cash to [facility].     Diagnosis Plan   1. Alcohol abuse            ED Disposition       ED Disposition   Transfer to Another Facility     Condition   --    Comment   --                  Chencho Benoit MD  11/13/24 6192

## 2024-11-13 NOTE — PROGRESS NOTES
Navigator is helping with the following referrals:    [facility] - [phone]  -Sent 11/13  -Transferred call so patient could complete phone screening. 11/13  -Patient completed phone screening. They do not allow methadone so patient did not want to commit to being admitted to their facility yet. Once patient makes a decision treatment team can call intake to further coordinate. 11/13    [facility] - [phone]  -Sent 11/13  -Transferred call so patient could complete phone screening.  11/13  -Patient will need to call his home [facility] clinic to get dosing records to send to Intake before they can officially accept patient and schedule bed.  11/13  - Received call from [facility]. They have already coordinated with the [place] location to transport patient to [facility] for dosing. Treatment team to call Friday to schedule bed date and transportation.  11/13    [facility] - [phone]

## 2024-11-14 PROCEDURE — 93010 ELECTROCARDIOGRAM REPORT: CPT | Performed by: INTERNAL MEDICINE

## 2024-11-14 PROCEDURE — 99232 SBSQ HOSP IP/OBS MODERATE 35: CPT | Performed by: PSYCHIATRY & NEUROLOGY

## 2024-11-14 RX ADMIN — LOSARTAN POTASSIUM 25 MG: 50 TABLET, FILM COATED ORAL at 08:36

## 2024-11-14 RX ADMIN — METHADONE HYDROCHLORIDE 20 MG: 10 TABLET ORAL at 08:36

## 2024-11-14 RX ADMIN — HYDROXYZINE HYDROCHLORIDE 50 MG: 50 TABLET, FILM COATED ORAL at 21:09

## 2024-11-14 RX ADMIN — LORAZEPAM 2 MG: 2 TABLET ORAL at 21:09

## 2024-11-14 RX ADMIN — LORAZEPAM 2 MG: 2 TABLET ORAL at 08:36

## 2024-11-14 RX ADMIN — LORAZEPAM 2 MG: 2 TABLET ORAL at 14:17

## 2024-11-14 NOTE — PLAN OF CARE
Goal Outcome Evaluation:  Plan of Care Reviewed With: patient  Plan of Care Reviewed With: patient  Patient Agreement with Plan of Care: agrees     Progress: improving  Outcome Evaluation: Pt has been calm and cooperative, spends majority of shift in room. Pt rates anxiety 4, depression 4, states sleep and appetite are getting better. Pt denies SI/HI/AVH. Pt rates cravings 6, c/o tremor.

## 2024-11-14 NOTE — PLAN OF CARE
Goal Outcome Evaluation:        Problem: Adult Behavioral Health Plan of Care  Goal: Patient-Specific Goal (Individualization)  Outcome: Progressing  Flowsheets  Taken 11/13/2024 0959  Patient/Family-Specific Goals (Include Timeframe): Patient will identify 2-3 coping skills, complete aftercare plans, address relapse prevention methods, and deny SI/HI prior to discharge. Patient's long term goal is to maintain sobriety for the next 30 days.  Individualized Care Needs: Therapist to offer 1-4 therapy sessions, aftercare planning, safety planning, group therapy, family education, and brief CBT/MI interventions.  Anxieties, Fears or Concerns: none verbalized  Taken 11/13/2024 0952  Patient Personal Strengths:   resilient   resourceful   motivated for recovery   motivated for treatment   stable living environment  Patient Vulnerabilities:   substance abuse/addiction   history of unsuccessful treatment   poor impulse control   lacks insight into illness   family/relationship conflict   recent loss  Goal: Optimized Coping Skills in Response to Life Stressors  Outcome: Progressing  Intervention: Promote Effective Coping Strategies  Flowsheets (Taken 11/14/2024 0932)  Supportive Measures:   active listening utilized   counseling provided   decision-making supported   goal-setting facilitated   verbalization of feelings encouraged   self-responsibility promoted   self-reflection promoted   self-care encouraged   relaxation techniques promoted   positive reinforcement provided  Goal: Develops/Participates in Therapeutic [place] to Support Successful Transition  Outcome: Progressing  Intervention: Foster Therapeutic [place]  Flowsheets (Taken 11/14/2024 0932)  Trust Relationship/Rapport:   care explained   reassurance provided   thoughts/feelings acknowledged   choices provided   emotional support provided   empathic listening provided   questions answered   questions encouraged  Intervention: Mutually Develop Transition  Plan  Flowsheets  Taken 11/14/2024 0929  Discharge Coordination/Progress: Patient has [employer] Medicaid. Patient to admit to [facility] at discharge, agency to provide transportation.  Transportation Anticipated: family or friend will provide  Transportation Concerns: none  Current Discharge Risk: substance use/abuse  Concerns to be Addressed:   substance/tobacco abuse/use   coping/stress   cognitive/perceptual   mental health   discharge planning  Readmission Within the Last 30 Days: no previous admission in last 30 days  Patient/Family Anticipated Services at Transition:   mental health services   outpatient care  Patient's Choice of Community Agency(s): [facility]  Patient/Family Anticipates Transition to: home with family  Offered/Gave Vendor List: yes  Taken 11/13/2024 0959  Outpatient/Agency/Support Group Needs:   outpatient substance abuse treatment (specify)   outpatient psychiatric care (specify)   residential services   outpatient medication management   outpatient counseling   intensive outpatient services  Transition Support:   follow-up care discussed   follow-up care coordinated   community resources reviewed   crisis management plan promoted   crisis management plan verbalized  Anticipated Discharge Disposition: home with family      DATA:  Therapist met with patient individually this date. Patient agreeable to discuss current treatment progress and discharge concerns.     CLINICAL MANUVERING/INTERVENTIONS:    Assisted patient in processing session content; acknowledged and normalized patient’s thoughts, feelings, and concerns by utilizing a person-centered approach in efforts to build appropriate rapport and a positive therapeutic relationship with open and honest communication. Allowed patient to ventilate regarding current stressors and triggers for negative emotions and thoughts in a safe nonjudgmental environment with unconditional positive regard, active listening skills, and empathy.  Therapist implemented motivational interviewing techniques to assist patient with exploring personal growth and change and discussed distress tolerance skills, self soothing techniques, and applied cognitive behavioral strategies to facilitate identification of maladaptive patterns of thinking and behavior. Therapist utilized dialectical behavior techniques to teach and model emotional regulation and relaxation methods. Therapist assisted patient with identifying and implementing healthier coping strategies.     ASSESSMENT:  Therapist met with patient on this date, he continues to receive treatment for alcohol detox. Patient reports mild anxiety and depression, denies SI/HI/AVH. Patient experiencing mild tremors and cravings. He is agreeable to admit to [facility] at discharge, agency to provide transportation. Treatment team will continue to keep RW updated on expected discharge date to coordinate admission. Patient denies having any additional needs or concerns at this time.     PLAN:   Patient will continue stabilization. Patient will continue to receive services offered by Treatment Team.     Patient to admit to [facility] at discharge.

## 2024-11-14 NOTE — PROGRESS NOTES
"INPATIENT PSYCHIATRIC PROGRESS NOTE    Name:  Alex Hoover  :  1975  MRN:  [id number]  Visit Number:  [id number]  Length of stay:  1    SUBJECTIVE    CC/Focus of Exam: Alcohol use    INTERVAL HISTORY:  The patient reports he is feeling better today. Has some withdrawal symptoms but they are getting better.   Depression rating 6/10  Anxiety rating 6/10  Sleep: good  Withdrawal sx: tremors, chills, sweats.  Cravin/10    Review of Systems   Constitutional:  Positive for chills, diaphoresis and fatigue.   Gastrointestinal:  Positive for nausea.   Neurological:  Positive for tremors.   Psychiatric/Behavioral:  Positive for dysphoric mood. The patient is nervous/anxious.        OBJECTIVE    Temp:  [97.5 °F (36.4 °C)-100 °F (37.8 °C)] 97.6 °F (36.4 °C)  Heart Rate:  [72-96] 74  Resp:  [18-20] 20  BP: (126-165)/(84-99) 135/99    MENTAL STATUS EXAM:  Appearance:Casually dressed, good hygeine.   Cooperation:Cooperative  Psychomotor: No psychomotor agitation/retardation, No EPS, No motor tics  Speech-normal rate, amount.  Mood \"anxious\"   Affect-congruent, appropriate, stable  Thought Content-goal directed, no delusional material present  Thought process-linear, organized.  Suicidality: No SI  Homicidality: No HI  Perception: No AH/VH  Insight-fair   Judgement-fair    Lab Results (last 24 hours)       ** No results found for the last 24 hours. **               Imaging Results (Last 24 Hours)       ** No results found for the last 24 hours. **               ECG/EMG Results (most recent)       Procedure Component Value Units Date/Time    ECG 12 Lead Other; Baseline Cardiac Status [[id number]] Collected: 24 0434     Updated: 24 1309     QT Interval 346 ms      QTC Interval 418 ms     Narrative:      Test Reason : Other~  Blood Pressure :   */*   mmHG  Vent. Rate :  88 BPM     Atrial Rate :  88 BPM     P-R Int : 134 ms          QRS Dur :  86 ms      QT Int : 346 ms       P-R-T Axes :  63   1  60 degrees    " QTcB Int : 418 ms    WRONG DATE  Normal sinus rhythm  Normal ECG  No previous ECGs available  Confirmed by Sunny Jackson ([id number]) on 11/13/2024 1:09:23 PM    Referred By:            Confirmed By: Sunny Jackson             ALLERGIES: Patient has no known allergies.    Medication Review:   Scheduled Medications:  LORazepam, 2 mg, Oral, 3 times per day   Followed by  [START ON 11/15/2024] LORazepam, 1.5 mg, Oral, 3 times per day   Followed by  [START ON 11/16/2024] LORazepam, 1 mg, Oral, 3 times per day   Followed by  [START ON 11/17/2024] LORazepam, 0.5 mg, Oral, 3 times per day  losartan, 25 mg, Oral, Daily  methadone, 20 mg, Oral, Daily         PRN Medications:    acetaminophen    aluminum-magnesium hydroxide-simethicone    benzonatate    benztropine **OR** benztropine    famotidine    hydrOXYzine    ibuprofen    loperamide    LORazepam **FOLLOWED BY** [START ON 11/15/2024] LORazepam **FOLLOWED BY** [START ON 11/16/2024] LORazepam **FOLLOWED BY** [START ON 11/17/2024] LORazepam    magnesium hydroxide    ondansetron ODT    polyethylene glycol    sodium chloride    traZODone   All medications reviewed.    ASSESSMENT & PLAN:      Alcohol use disorder, severe, dependence  -Continue Ativan detox  -Thiamine and folate       Opioid use disorder, severe, on maintenance therapy  -Continue methadone maintenance       HTN (hypertension)   -Continue losartan       Nicotine use disorder  -Encourage cessation       Tetrahydrocannabinol (THC) use disorder, moderate, dependence  -Supportive treatment    Special precautions: Special Precautions Level 4 (q30 min checks).    Behavioral Health Treatment Plan and Problem List: I have reviewed and approved the Behavioral Health Treatment Plan and Problem list.  The patient has had a chance to review and agrees with the treatment plan.    Copied text in portions of this note has been reviewed and is accurate as of 11/14/24         Clinician:  Lenora Mclain MD  11/14/24  14:23 EST

## 2024-11-14 NOTE — PLAN OF CARE
Goal Outcome Evaluation:  Plan of Care Reviewed With: patient  Patient Agreement with Plan of Care: agrees     Pt rates anxiety 9/10, depression 8/10, cravings 5/10. Appetite is good for shift. Pt given Ativan 2mg PAS dose for CIWA score and elevated blood pressure.

## 2024-11-15 PROCEDURE — 99232 SBSQ HOSP IP/OBS MODERATE 35: CPT | Performed by: PSYCHIATRY & NEUROLOGY

## 2024-11-15 RX ADMIN — LORAZEPAM 1.5 MG: 1 TABLET ORAL at 08:52

## 2024-11-15 RX ADMIN — IBUPROFEN 400 MG: 400 TABLET, FILM COATED ORAL at 08:52

## 2024-11-15 RX ADMIN — METHADONE HYDROCHLORIDE 20 MG: 10 TABLET ORAL at 08:52

## 2024-11-15 RX ADMIN — HYDROXYZINE HYDROCHLORIDE 50 MG: 50 TABLET, FILM COATED ORAL at 21:01

## 2024-11-15 RX ADMIN — HYDROXYZINE HYDROCHLORIDE 50 MG: 50 TABLET, FILM COATED ORAL at 08:52

## 2024-11-15 RX ADMIN — LOSARTAN POTASSIUM 25 MG: 50 TABLET, FILM COATED ORAL at 08:52

## 2024-11-15 NOTE — PROGRESS NOTES
"INPATIENT PSYCHIATRIC PROGRESS NOTE    Name:  Alex Hoover  :  1975  MRN:  [id number]  Visit Number:  [id number]  Length of stay:  2    SUBJECTIVE    CC/Focus of Exam: Alcohol use    INTERVAL HISTORY:  The patient reports he is feeling better today and he is not experiencing any active withdrawals. He agreed to change to detox to last day and if he continues to do well, he may be discharged tomorrow.   Depression rating 5/10  Anxiety rating 5/10  Sleep: good  Withdrawal sx: None  Cravin/10    Review of Systems   Constitutional: Negative.    Respiratory: Negative.     Cardiovascular: Negative.    Gastrointestinal: Negative.    Psychiatric/Behavioral:  Positive for dysphoric mood. The patient is nervous/anxious.        OBJECTIVE    Temp:  [97 °F (36.1 °C)-98.3 °F (36.8 °C)] 98.3 °F (36.8 °C)  Heart Rate:  [65-96] 78  Resp:  [16-18] 18  BP: (105-154)/() 134/88    MENTAL STATUS EXAM:  Appearance:Casually dressed, good hygeine.   Cooperation:Cooperative  Psychomotor: No psychomotor agitation/retardation, No EPS, No motor tics  Speech-normal rate, amount.  Mood \"anxious\"   Affect-congruent, appropriate, stable  Thought Content-goal directed, no delusional material present  Thought process-linear, organized.  Suicidality: No SI  Homicidality: No HI  Perception: No AH/VH  Insight-fair   Judgement-fair    Lab Results (last 24 hours)       ** No results found for the last 24 hours. **               Imaging Results (Last 24 Hours)       ** No results found for the last 24 hours. **               ECG/EMG Results (most recent)       Procedure Component Value Units Date/Time    ECG 12 Lead Other; Baseline Cardiac Status [[id number]] Collected: 24 0434     Updated: 24 1309     QT Interval 346 ms      QTC Interval 418 ms     Narrative:      Test Reason : Other~  Blood Pressure :   */*   mmHG  Vent. Rate :  88 BPM     Atrial Rate :  88 BPM     P-R Int : 134 ms          QRS Dur :  86 ms      QT Int : 346 " ms       P-R-T Axes :  63   1  60 degrees    QTcB Int : 418 ms    WRONG DATE  Normal sinus rhythm  Normal ECG  No previous ECGs available  Confirmed by Sunny Jackson ([id number]) on 11/13/2024 1:09:23 PM    Referred By:            Confirmed By: Sunny Jackson             ALLERGIES: Patient has no known allergies.    Medication Review:   Scheduled Medications:  losartan, 25 mg, Oral, Daily  methadone, 20 mg, Oral, Daily         PRN Medications:    acetaminophen    aluminum-magnesium hydroxide-simethicone    benzonatate    benztropine **OR** benztropine    famotidine    hydrOXYzine    ibuprofen    loperamide    magnesium hydroxide    ondansetron ODT    polyethylene glycol    sodium chloride    traZODone   All medications reviewed.    ASSESSMENT & PLAN:      Alcohol use disorder, severe, dependence  -Continue Ativan detox, change to day 4 today and if patient does well, may be discharged tomorrow.   -Thiamine and folate       Opioid use disorder, severe, on maintenance therapy  -Continue methadone maintenance       HTN (hypertension)   -Continue losartan       Nicotine use disorder  -Encourage cessation       Tetrahydrocannabinol (THC) use disorder, moderate, dependence  -Supportive treatment    Special precautions: Special Precautions Level 4 (q30 min checks).    Behavioral Health Treatment Plan and Problem List: I have reviewed and approved the Behavioral Health Treatment Plan and Problem list.  The patient has had a chance to review and agrees with the treatment plan.    Copied text in portions of this note has been reviewed and is accurate as of 11/15/24         Clinician:  Lenora Mclain MD  11/15/24  13:01 EST

## 2024-11-15 NOTE — PLAN OF CARE
Goal Outcome Evaluation:  Plan of Care Reviewed With: patient  Plan of Care Reviewed With: patient  Patient Agreement with Plan of Care: agrees     Progress: improving  Outcome Evaluation: Pt calm and cooperative this shift. Reports minimal WD symptoms. States feeling better today. Denies SI/HI/AVH. No distress noted.

## 2024-11-15 NOTE — PLAN OF CARE
Goal Outcome Evaluation:  Plan of Care Reviewed With: patient  Plan of Care Reviewed With: patient  Patient Agreement with Plan of Care: agrees     Progress: improving     Pt rates anxiety 7/10. Depression 7/10, and cravings 4/10. Participated in group and appetite is good for shift. Pt given Atarax prn medication.

## 2024-11-15 NOTE — PLAN OF CARE
Goal Outcome Evaluation:        Problem: Adult Behavioral Health Plan of Care  Goal: Patient-Specific Goal (Individualization)  Outcome: Progressing  Flowsheets  Taken 11/13/2024 0959  Patient/Family-Specific Goals (Include Timeframe): Patient will identify 2-3 coping skills, complete aftercare plans, address relapse prevention methods, and deny SI/HI prior to discharge. Patient's long term goal is to maintain sobriety for the next 30 days.  Individualized Care Needs: Therapist to offer 1-4 therapy sessions, aftercare planning, safety planning, group therapy, family education, and brief CBT/MI interventions.  Anxieties, Fears or Concerns: none verbalized  Taken 11/13/2024 0952  Patient Personal Strengths:   resilient   resourceful   motivated for recovery   motivated for treatment   stable living environment  Patient Vulnerabilities:   substance abuse/addiction   history of unsuccessful treatment   poor impulse control   lacks insight into illness   family/relationship conflict   recent loss  Goal: Optimized Coping Skills in Response to Life Stressors  Outcome: Progressing  Intervention: Promote Effective Coping Strategies  Flowsheets (Taken 11/15/2024 0933)  Supportive Measures:   active listening utilized   counseling provided   decision-making supported   goal-setting facilitated   verbalization of feelings encouraged   self-responsibility promoted   self-reflection promoted   self-care encouraged   positive reinforcement provided   relaxation techniques promoted  Goal: Develops/Participates in Therapeutic [place] to Support Successful Transition  Outcome: Progressing  Intervention: Foster Therapeutic [place]  Flowsheets (Taken 11/15/2024 0933)  Trust Relationship/Rapport:   care explained   questions encouraged   choices provided   reassurance provided   thoughts/feelings acknowledged   emotional support provided   empathic listening provided   questions answered  Intervention: Mutually Develop Transition  Plan  Flowsheets  Taken 11/15/2024 0932  Transportation Anticipated: agency  Transportation Concerns: none  Current Discharge Risk: substance use/abuse  Concerns to be Addressed:   substance/tobacco abuse/use   coping/stress   cognitive/perceptual   mental health   discharge planning  Readmission Within the Last 30 Days: no previous admission in last 30 days  Patient/Family Anticipated Services at Transition: rehabilitation services  Patient/Family Anticipates Transition to: inpatient rehabilitation facility  Offered/Gave Vendor List: yes  Taken 11/14/2024 0929  Discharge Coordination/Progress: Patient has [employer]. Patient to admit to [facility] at discharge, agency to provide transportation.  Patient's Choice of Community Agency(s): [facility]  Taken 11/13/2024 0959  Outpatient/Agency/Support Group Needs:   outpatient substance abuse treatment (specify)   outpatient psychiatric care (specify)   residential services   outpatient medication management   outpatient counseling   intensive outpatient services  Transition Support:   follow-up care discussed   follow-up care coordinated   community resources reviewed   crisis management plan promoted   crisis management plan verbalized  Anticipated Discharge Disposition: home with family         DATA:  Therapist met with patient individually this date. Patient agreeable to discuss current treatment progress and discharge concerns.     CLINICAL MANUVERING/INTERVENTIONS:   Assisted patient in processing session content; acknowledged and normalized patient’s thoughts, feelings, and concerns by utilizing a person-centered approach in efforts to build appropriate rapport and a positive therapeutic relationship with open and honest communication. Allowed patient to ventilate regarding current stressors and triggers for negative emotions and thoughts in a safe nonjudgmental environment with unconditional positive regard, active listening skills, and empathy.  Therapist implemented motivational interviewing techniques to assist patient with exploring personal growth and change and discussed distress tolerance skills, self soothing techniques, and applied cognitive behavioral strategies to facilitate identification of maladaptive patterns of thinking and behavior.Therapist utilized dialectical behavior techniques to teach and model emotional regulation and relaxation methods. Therapist assisted patient with identifying and implementing healthier coping strategies.     ASSESSMENT:  Therapist met with patient on this date. He continues to receive treatment for alcohol detox and expects to discharge to [facility] tomorrow. Patient reports moderate anxiety and depression, denies SI/HI/AVH. Patient denies experiencing active withdrawal symptoms. [facility] to provide transportation. Patient denies having any additional needs or concerns.     PLAN:   Patient will continue stabilization. Patient will continue to receive services offered by Treatment Team.     Patient to admit to [facility] at discharge.

## 2024-11-15 NOTE — PROGRESS NOTES
[facility] - [phone]  -Sent 11/13  -Transferred call so patient could complete phone screening.  11/13  -Patient will need to call his home [facility] clinic to get dosing records to send to Intake before they can officially accept patient and schedule bed.  11/13  - Received call from [facility]. They have already coordinated with the [place] location to transport patient to [facility] for dosing. Treatment team to call Friday to schedule bed date and transportation.  11/13  -Patient being held a bed at the [place] location for Monday. Treatment team to call Monday morning to further coordinate.  11/15

## 2024-11-16 PROCEDURE — 99232 SBSQ HOSP IP/OBS MODERATE 35: CPT | Performed by: PSYCHIATRY & NEUROLOGY

## 2024-11-16 RX ORDER — CHLORDIAZEPOXIDE HYDROCHLORIDE 10 MG/1
10 CAPSULE, GELATIN COATED ORAL 2 TIMES DAILY
Status: COMPLETED | OUTPATIENT
Start: 2024-11-16 | End: 2024-11-17

## 2024-11-16 RX ORDER — MIRTAZAPINE 15 MG/1
7.5 TABLET, FILM COATED ORAL NIGHTLY
Status: DISCONTINUED | OUTPATIENT
Start: 2024-11-16 | End: 2024-11-17 | Stop reason: HOSPADM

## 2024-11-16 RX ADMIN — MIRTAZAPINE 7.5 MG: 15 TABLET, FILM COATED ORAL at 21:03

## 2024-11-16 RX ADMIN — IBUPROFEN 400 MG: 400 TABLET, FILM COATED ORAL at 08:17

## 2024-11-16 RX ADMIN — HYDROXYZINE HYDROCHLORIDE 50 MG: 50 TABLET, FILM COATED ORAL at 08:17

## 2024-11-16 RX ADMIN — CHLORDIAZEPOXIDE HYDROCHLORIDE 10 MG: 10 CAPSULE ORAL at 21:03

## 2024-11-16 RX ADMIN — CHLORDIAZEPOXIDE HYDROCHLORIDE 10 MG: 10 CAPSULE ORAL at 11:31

## 2024-11-16 RX ADMIN — LOSARTAN POTASSIUM 25 MG: 50 TABLET, FILM COATED ORAL at 08:17

## 2024-11-16 RX ADMIN — METHADONE HYDROCHLORIDE 20 MG: 10 TABLET ORAL at 08:17

## 2024-11-16 NOTE — PROGRESS NOTES
"INPATIENT PSYCHIATRIC PROGRESS NOTE    Name:  Alex Hoover  :  1975  MRN:  [id number]  Visit Number:  [id number]  Length of stay:  3    SUBJECTIVE    CC/Focus of Exam: Alcohol dependence    INTERVAL HISTORY:  First time seeing patient.  Chart, notes, vitals, labs and EKG personally reviewed.Sodium 135, chloride 96, glucose 101, calcium 8.5, . MCH 33.6, MCHC 36.3. Hep screen negative. Blood alcohol level 289 mg/dL. UDS positive for methadone, thc.     Alcohol detox was escalated, but patient continues to experience some mild symptoms, appearing tremulous, restless and severe insomnia overnight.  We will add low-dose Librium today.  Patient could potentially be accepted to rehab tomorrow at [facility].    Depression rating 3/10  Anxiety rating 6/10  Sleep: Poor  Withdrawal sx: Per HPI  Craving: 3/10    Review of Systems   Constitutional: Negative.    Respiratory: Negative.     Cardiovascular: Negative.    Gastrointestinal: Negative.    Musculoskeletal: Negative.    Neurological:  Positive for tremors.   Psychiatric/Behavioral:  Positive for dysphoric mood and sleep disturbance. The patient is nervous/anxious.        OBJECTIVE    Temp:  [97.4 °F (36.3 °C)-98.4 °F (36.9 °C)] 98.1 °F (36.7 °C)  Heart Rate:  [70-85] 70  Resp:  [16-18] 18  BP: (120-127)/(82-86) 120/86    MENTAL STATUS EXAM:  Appearance: Casually dressed, good hygeine.   Cooperation: Cooperative  Psychomotor: No psychomotor agitation/retardation, No EPS, No motor tics  Speech: normal rate, amount.  Mood: \"Same as yesterday\"   Affect: congruent, anxious  Thought Content: goal directed, no delusional material present  Thought process: linear, organized.  Suicidality: No SI  Homicidality: No HI  Perception: No AH/VH  Insight: fair   Judgment: fair    Lab Results (last 24 hours)       ** No results found for the last 24 hours. **               Imaging Results (Last 24 Hours)       ** No results found for the last 24 hours. **         "       ECG/EMG Results (most recent)       Procedure Component Value Units Date/Time    ECG 12 Lead Other; Baseline Cardiac Status [[id number]] Collected: 11/14/24 0434     Updated: 11/13/24 1309     QT Interval 346 ms      QTC Interval 418 ms     Narrative:      Test Reason : Other~  Blood Pressure :   */*   mmHG  Vent. Rate :  88 BPM     Atrial Rate :  88 BPM     P-R Int : 134 ms          QRS Dur :  86 ms      QT Int : 346 ms       P-R-T Axes :  63   1  60 degrees    QTcB Int : 418 ms    WRONG DATE  Normal sinus rhythm  Normal ECG  No previous ECGs available  Confirmed by Sunny Jackson ([id number]) on 11/13/2024 1:09:23 PM    Referred By:            Confirmed By: Sunny Jackson             ALLERGIES: Patient has no known allergies.      Current Facility-Administered Medications:     acetaminophen (TYLENOL) tablet 650 mg, 650 mg, Oral, Q6H PRN, Brayden Cash MD    aluminum-magnesium hydroxide-simethicone (MAALOX MAX) 400-400-40 MG/5ML suspension 15 mL, 15 mL, Oral, Q6H PRN, Brayden Cash MD    benzonatate (TESSALON) capsule 100 mg, 100 mg, Oral, TID PRN, Brayden Cash MD    benztropine (COGENTIN) tablet 2 mg, 2 mg, Oral, Once PRN **OR** benztropine (COGENTIN) injection 1 mg, 1 mg, Intramuscular, Once PRN, Brayden Cash MD    famotidine (PEPCID) tablet 20 mg, 20 mg, Oral, BID PRN, Brayden Cash MD    hydrOXYzine (ATARAX) tablet 50 mg, 50 mg, Oral, Q6H PRN, Brayden Cash MD, 50 mg at 11/16/24 0817    ibuprofen (ADVIL,MOTRIN) tablet 400 mg, 400 mg, Oral, Q6H PRN, Brayden Cash MD, 400 mg at 11/16/24 0817    loperamide (IMODIUM) capsule 2 mg, 2 mg, Oral, Q2H PRN, Brayden Cash MD    losartan (COZAAR) tablet 25 mg, 25 mg, Oral, Daily, Brayden Cash MD, 25 mg at 11/16/24 0817    magnesium hydroxide (MILK OF MAGNESIA) suspension 10 mL, 10 mL, Oral, Daily PRN, Brayden Cash MD    methadone (DOLOPHINE) tablet 20 mg, 20 mg, Oral, Daily, Lenora Mclain MD, 20 mg at 11/16/24 0817     ondansetron ODT (ZOFRAN-ODT) disintegrating tablet 4 mg, 4 mg, Translingual, Q6H PRN, Brayden Cash MD    polyethylene glycol (MIRALAX) packet 17 g, 17 g, Oral, Daily PRN, Brayden Cash MD    sodium chloride nasal spray 2 spray, 2 spray, Each Nare, PRN, Brayden Cash MD    traZODone (DESYREL) tablet 50 mg, 50 mg, Oral, Nightly PRN, Brayden Cash MD    Reviewed chart, notes, vitals, labs and EKG personally reviewed.    ASSESSMENT & PLAN:    Alcohol use disorder, severe, dependence  -Discontinue Ativan detox yesterday.  Patient symptoms appeared to have mildly worsened, so we will add low-dose Librium today  -Thiamine and folate  -Patient reportedly has a bed at [facility] as early as tomorrow    Insomnia  -Begin mirtazapine 7.5 mg nightly       Opioid use disorder, severe, on maintenance therapy  -Continue methadone maintenance       HTN (hypertension)   -Continue losartan       Nicotine use disorder  -Encourage cessation       Tetrahydrocannabinol (THC) use disorder, moderate, dependence  -Supportive treatment      Special precautions: Special Precautions Level 3 (q15 min checks)     Behavioral Health Treatment Plan and Problem List: I have reviewed and approved the Behavioral Health Treatment Plan and Problem list.  The patient has had a chance to review and agrees with the treatment plan.    I have reviewed the copied text and it is accurate as of 11/16/24     Clinician:  Rosendo Valladares MD  11/16/24  10:46 EST

## 2024-11-16 NOTE — PLAN OF CARE
Goal Outcome Evaluation:  Plan of Care Reviewed With: patient  Plan of Care Reviewed With: patient  Patient Agreement with Plan of Care: agrees     Progress: improving  Outcome Evaluation: Pt calm and cooperative this shift. Started on librium per MD order. Pt reports feeling better after first dose. Denies SI/HI/AVH. No distress noted.

## 2024-11-17 VITALS
OXYGEN SATURATION: 98 % | HEIGHT: 70 IN | BODY MASS INDEX: 21.82 KG/M2 | HEART RATE: 78 BPM | DIASTOLIC BLOOD PRESSURE: 90 MMHG | WEIGHT: 152.4 LBS | RESPIRATION RATE: 18 BRPM | SYSTOLIC BLOOD PRESSURE: 149 MMHG | TEMPERATURE: 98 F

## 2024-11-17 PROCEDURE — 99239 HOSP IP/OBS DSCHRG MGMT >30: CPT | Performed by: PSYCHIATRY & NEUROLOGY

## 2024-11-17 RX ORDER — ROPINIROLE 0.5 MG/1
0.5 TABLET, FILM COATED ORAL NIGHTLY
Qty: 30 TABLET | Refills: 0 | Status: SHIPPED | OUTPATIENT
Start: 2024-11-17

## 2024-11-17 RX ORDER — LOSARTAN POTASSIUM 25 MG/1
25 TABLET ORAL DAILY
Qty: 30 TABLET | Refills: 0 | Status: SHIPPED | OUTPATIENT
Start: 2024-11-17

## 2024-11-17 RX ORDER — MIRTAZAPINE 7.5 MG/1
7.5 TABLET, FILM COATED ORAL NIGHTLY
Qty: 30 TABLET | Refills: 0 | Status: SHIPPED | OUTPATIENT
Start: 2024-11-17

## 2024-11-17 RX ADMIN — LOSARTAN POTASSIUM 25 MG: 50 TABLET, FILM COATED ORAL at 08:21

## 2024-11-17 RX ADMIN — METHADONE HYDROCHLORIDE 20 MG: 10 TABLET ORAL at 08:21

## 2024-11-17 RX ADMIN — CHLORDIAZEPOXIDE HYDROCHLORIDE 10 MG: 10 CAPSULE ORAL at 08:21

## 2024-11-17 RX ADMIN — HYDROXYZINE HYDROCHLORIDE 50 MG: 50 TABLET, FILM COATED ORAL at 08:21

## 2024-11-17 NOTE — DISCHARGE SUMMARY
"      PSYCHIATRIC DISCHARGE SUMMARY     Patient Identification:  Name:  Alex Hoover  Age:  49 y.o.  Sex:  male  :  1975  MRN:  [id number]  Visit Number:  [id number]    Date of Admission:2024   Date of Discharge:  2024    Discharge Diagnosis:  Principal Problem:    Alcohol use disorder, severe, dependence  Active Problems:    Opioid use disorder, severe, on maintenance therapy    HTN (hypertension)    Nicotine use disorder    Tetrahydrocannabinol (THC) use disorder, moderate, dependence      Admission Diagnosis:  Alcohol dependence [F10.20]     Hospital Course  Patient is a 49 y.o. male presented with alcohol dependence.  Admitted for medically assisted detox.  Started on Ativan detox protocol.  Continued home medications.  Patient completed detox without any significant setback or complication.  He reported improvement of symptoms.  Intermittent insomnia and restlessness persisted, for which mirtazapine and propranolol were initiated.  Patient accepted for substance abuse treatment [facility], to which she will be discharged today.    By the conclusion of this hospitalization, patient is exhibiting no acutely concerning symptoms of mood, psychotic or thought disorder that would necessitate further inpatient care. Patient is also denying SI, HI, and AVH. Patient has shown improvement of presenting symptoms, exhibited no behavior concerning for harm to self or others, and is considered appropriate for discharge to a lower level of care today. Treatment and safe discharge planning completed. Outpatient care ascertained.     Mental Status Exam upon discharge:   Mood \"better\"   Affect-congruent, appropriate, stable  Thought Content-goal directed, no delusional material present  Thought process-linear, organized.  Suicidality: No SI  Homicidality: No HI  Perception: No AH/    Procedures Performed         Consults:   Consults       No orders found from 10/15/2024 to 2024.      "       Pertinent Test Results:   Lab Results (last 7 days)       Procedure Component Value Units Date/Time    Hepatitis Panel, Acute [[id number]]  (Normal) Collected: 11/13/24 0550    Specimen: Blood Updated: 11/13/24 0649     Hepatitis B Surface Ag Non-Reactive     Hep A IgM Non-Reactive     Hep B C IgM Non-Reactive     Hepatitis C Ab Non-Reactive    Narrative:      Results may be falsely decreased if patient taking Biotin.             Condition on Discharge:  improved    Vital Signs  Temp:  [97.1 °F (36.2 °C)-98.6 °F (37 °C)] 98.6 °F (37 °C)  Heart Rate:  [61-84] 72  Resp:  [16-18] 18  BP: (109-151)/(68-95) 151/95    Discharge Disposition:  Rehab Facility or Unit (DC - External)    Discharge Medications:     Discharge Medications        New Medications        Instructions Start Date   Athletes Foot (Terbinafine) 1 % cream  Generic drug: terbinafine   1 Application, Topical, 2 Times Daily      mirtazapine 7.5 MG tablet  Commonly known as: REMERON   7.5 mg, Oral, Nightly      rOPINIRole 0.5 MG tablet  Commonly known as: REQUIP   0.5 mg, Oral, Nightly, Take 1 hour before bedtime.             Continue These Medications        Instructions Start Date   losartan 25 MG tablet  Commonly known as: COZAAR   25 mg, Oral, Daily      methadone 10 MG tablet  Commonly known as: DOLOPHINE   20 mg, Oral, Daily               Discharge Diet: Normal    Activity at Discharge: Normal    Follow-up Appointments  No future appointments.      Test Results Pending at Discharge  None     Time: I spent greater than 30 minutes on this discharge activity which included: face-to-face encounter with the patient, reviewing the data in the system, coordination of the care with the nursing staff as well as consultants, documentation, and entering orders.      Clinician:   Rosendo Valladares MD  11/17/24  11:13 EST

## 2024-11-17 NOTE — PROGRESS NOTES
Behavioral Health Discharge Summary             Please fax within 24 hours of discharge to [facility] at: [phone]      Member Name: Alex Hoover Member ID: [id number]   Authorization Number: [id number]  Phone: [phone] (M)   Member Address: [address] [place] [id number]   Discharge Date: 11/17/2024 Level of Care at Discharge: SELF CARE   Facility: [facility] Staff Completing Form: DARBY OLMOS RN UR   If the member is being discharged directly to a residential or extended care program, please specify the type below.   __Private Child-Caring Facility (PCC) Residential/Group Home   __Private Child-Caring Facility (PCC) Therapeutic Foster Care   __Residential Treatment Facility (RTF)   __Psychiatric Residential Treatment Facility (PRTF I or II)   __Long-Term Acute Inpatient Hospital Services or Extended Care Unit (ECU)   __Other (please specify):    Brief discharge summary of treatment received (for follow up by the case management team): D/C clinical with list of medications and follow up appts given to patient upon discharge.     BRIEF SUMMARY OF RECOMMENDATIONS FOR ONGOING TREATMENT     Discharged to where: HOME/SELF CARE, [facility]   Discharge diagnoses: F10.20   Axis I:    Axis II:    Axis III:    Axis IV:    Axis V:    Does the member understand his/her DX?  Yes          Medication     Dose     Schedule Supply/  Quantity  Given at Discharge RX Provided  Yes/No  If Rx Provided, Quantity RX Prior Auth Required  Yes/No Prior Auth  Completed   Athletes Foot (Terbinafine) 1 % cream 1 % cream Apply 1 Application topically to the appropriate area as directed 2 (Two) Times a Day        mirtazapine 7.5 MG tablet Take 1 tablet by mouth Every Night.        rOPINIRole  0.5 MG tablet Take 1 tablet by mouth Every Night. Take 1 hour before bedtime.                                                                    Does the member understand the reason for taking these  medications? Yes                                                           FOLLOW-UP APPOINTMENTS   Please schedule within 7 days of discharge and provide appointment details for all referred services.    PCP/Other Providers Involved in Treatment:    Appointment Type: BHOP, substance abuse treatment Provider Name: [facility]  [address] [place] [address]        Provider Phone: [phone] Appointment Date: 11/17/2024 Appointment Time: Admit at discharge     Assessment   (new to OP services)        Case Management    Is the member already enrolled in case management?  Yes/No  If yes, date the CM was notified:    If no, was the CM referral offered?  Yes/No  Accepted? Yes/No    Is the Release of Information in the chart? Yes/No:      Medication Management (for member discharged with psychiatric medications):      A&D Treatment (for member with substance abuse/   dependence in the past year):      Medical Condition (for member with a medical condition):    Other recommended treatment:    Do you have any concerns about the discharge plan?  No    If yes, explain:    Was the member involved in the discharge planning?  Yes    If no, explain:    Was a copy of the discharge plan provided to the member?  Yes    If no, explain:

## 2024-11-17 NOTE — PLAN OF CARE
Goal Outcome Evaluation:  Plan of Care Reviewed With: patient  Plan of Care Reviewed With: patient  Patient Agreement with Plan of Care: agrees     Progress: improving  Outcome Evaluation: Patient calm and cooperative this shift, has spent time out in the dayroom. Patient rated anxiety 7/10, depression 5/10.Denies SI/HI/AVH. Sleep- good. Appetite- poor. Patient rated Cravings 3/10. Patient has no complaints at this time.

## 2024-11-17 NOTE — PLAN OF CARE
Goal Outcome Evaluation:  Plan of Care Reviewed With: patient  Plan of Care Reviewed With: patient  Patient Agreement with Plan of Care: agrees     Progress: improving